# Patient Record
Sex: FEMALE | Race: BLACK OR AFRICAN AMERICAN | NOT HISPANIC OR LATINO | Employment: OTHER | URBAN - METROPOLITAN AREA
[De-identification: names, ages, dates, MRNs, and addresses within clinical notes are randomized per-mention and may not be internally consistent; named-entity substitution may affect disease eponyms.]

---

## 2020-01-24 PROBLEM — M54.50 CHRONIC LEFT-SIDED LOW BACK PAIN WITHOUT SCIATICA: Status: ACTIVE | Noted: 2017-01-04

## 2020-01-24 PROBLEM — M15.9 PRIMARY OSTEOARTHRITIS INVOLVING MULTIPLE JOINTS: Status: ACTIVE | Noted: 2020-01-24

## 2020-01-24 PROBLEM — E89.0 POST-OPERATIVE HYPOTHYROIDISM: Status: ACTIVE | Noted: 2017-07-06

## 2020-01-24 PROBLEM — I10 ESSENTIAL HYPERTENSION WITH GOAL BLOOD PRESSURE LESS THAN 130/85: Status: ACTIVE | Noted: 2020-01-24

## 2020-01-24 PROBLEM — E66.9 OBESITY, CLASS I, BMI 30-34.9: Status: ACTIVE | Noted: 2020-01-24

## 2020-01-24 PROBLEM — G89.29 CHRONIC LEFT-SIDED LOW BACK PAIN WITHOUT SCIATICA: Status: ACTIVE | Noted: 2017-01-04

## 2020-01-24 PROBLEM — Z86.39 HISTORY OF GOITER: Status: ACTIVE | Noted: 2020-01-24

## 2020-01-24 PROBLEM — E78.5 DYSLIPIDEMIA: Status: ACTIVE | Noted: 2020-01-24

## 2024-04-28 PROBLEM — R00.1 BRADYCARDIA: Status: ACTIVE | Noted: 2024-04-28

## 2024-04-28 PROBLEM — N17.9 AKI (ACUTE KIDNEY INJURY): Status: ACTIVE | Noted: 2024-04-28

## 2024-04-28 PROBLEM — D50.9 IRON DEFICIENCY ANEMIA: Status: ACTIVE | Noted: 2024-04-28

## 2024-04-28 PROBLEM — E78.5 DYSLIPIDEMIA: Status: RESOLVED | Noted: 2020-01-24 | Resolved: 2024-04-28

## 2024-04-28 PROBLEM — R55 SYNCOPE: Status: ACTIVE | Noted: 2024-04-28

## 2024-04-29 PROBLEM — E86.0 DEHYDRATION: Status: ACTIVE | Noted: 2024-04-29

## 2024-04-29 PROBLEM — Z71.89 GOALS OF CARE, COUNSELING/DISCUSSION: Status: ACTIVE | Noted: 2024-04-29

## 2024-04-29 PROBLEM — D64.9 SYMPTOMATIC ANEMIA: Status: ACTIVE | Noted: 2024-04-29

## 2024-04-29 PROBLEM — R41.0 DELIRIUM: Status: ACTIVE | Noted: 2024-04-29

## 2024-05-01 ENCOUNTER — PATIENT OUTREACH (OUTPATIENT)
Dept: ADMINISTRATIVE | Facility: CLINIC | Age: 89
End: 2024-05-01

## 2024-05-01 NOTE — PROGRESS NOTES
C3 nurse spoke with Neida Hansen and Analilia sears  for a T[facility] post hospital discharge follow up call. The patient has a scheduled [facility]OS[facility] appointment with Jw Larson MD on 05/07/24 @ 0854.

## 2024-05-06 PROBLEM — D64.9 SYMPTOMATIC ANEMIA: Status: ACTIVE | Noted: 2024-05-06

## 2024-05-06 PROBLEM — D50.9 MICROCYTIC ANEMIA: Status: ACTIVE | Noted: 2024-04-29

## 2024-05-06 PROBLEM — R41.82 ALTERED MENTAL STATUS: Status: ACTIVE | Noted: 2024-05-06
